# Patient Record
Sex: MALE
[De-identification: names, ages, dates, MRNs, and addresses within clinical notes are randomized per-mention and may not be internally consistent; named-entity substitution may affect disease eponyms.]

---
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
## 2024-09-06 ENCOUNTER — APPOINTMENT (OUTPATIENT)
Dept: OTOLARYNGOLOGY | Facility: CLINIC | Age: 19
End: 2024-09-06
Payer: COMMERCIAL

## 2024-09-06 ENCOUNTER — NON-APPOINTMENT (OUTPATIENT)
Age: 19
End: 2024-09-06

## 2024-09-06 VITALS — BODY MASS INDEX: 24.64 KG/M2 | WEIGHT: 176 LBS | TEMPERATURE: 98 F | HEIGHT: 71 IN

## 2024-09-06 DIAGNOSIS — J03.90 ACUTE TONSILLITIS, UNSPECIFIED: ICD-10-CM

## 2024-09-06 PROCEDURE — 99203 OFFICE O/P NEW LOW 30 MIN: CPT

## 2024-09-06 NOTE — ASSESSMENT
[FreeTextEntry1] : 19y/o male who was referred to come in by his student wellness center to have his throat checked out to r/o PTA. He was given abx. He no longer has any throat pain or symptoms. -Throat exam normal, no signs of PTA or acute tonsillitis -suspect viral URI that has resolved and cleared to return to school and full activities today - if any joint pain recur he should f/u with PCP -F/u as needed

## 2024-09-06 NOTE — HISTORY OF PRESENT ILLNESS
[de-identified] : 19y/o male who was referred to come in by his student wellness center to have his throat checked out to r/o PTA. He states a couple of days ago had a sore throat and achy joints. He was seen at student [facility] and thought he had tonsillitis. He was placed on antibiotics.  He has no throat pain or dysphagia. He denies any fevers or chills. He denies any recurrent strep infections. He currently feels well.

## 2024-09-06 NOTE — CONSULT LETTER
[Dear  ___] : Dear  [unfilled], [Consult Letter:] : I had the pleasure of evaluating your patient, [unfilled]. [Please see my note below.] : Please see my note below. [Consult Closing:] : Thank you very much for allowing me to participate in the care of this patient.  If you have any questions, please do not hesitate to contact me. [Sincerely,] : Sincerely, [FreeTextEntry3] : Anette Hampton MD Otolaryngology and Cranial Base Surgery  Attending Physician- Department of Otolaryngology and Head & Neck Surgery  [facility] [facility] Office: [phone]  Fax: [phone]

## 2024-09-06 NOTE — END OF VISIT
[FreeTextEntry3] : I personally saw and examined Mr. Dank Goldsmith in detail this visit today. I personally reviewed the HPI, PMH, FH, SH, ROS and medications/allergies. I have spoken to KIMBERLY Castro regarding the history and have personally determined the assessment and plan of care, and documented this myself. I was present and participated in all key portions of the encounter and all procedures noted above. I have made changes in the body of the note where appropriate.   Attesting Faculty: See Attending Signature Below

## 2024-09-06 NOTE — PHYSICAL EXAM
[Normal] : mucosa is normal [Midline] : trachea located in midline position [de-identified] : 2-3+ without exudates or erythema

## 2025-06-10 ENCOUNTER — NON-APPOINTMENT (OUTPATIENT)
Age: 20
End: 2025-06-10